# Patient Record
Sex: FEMALE | Race: BLACK OR AFRICAN AMERICAN | Employment: UNEMPLOYED | URBAN - METROPOLITAN AREA
[De-identification: names, ages, dates, MRNs, and addresses within clinical notes are randomized per-mention and may not be internally consistent; named-entity substitution may affect disease eponyms.]

---

## 2017-09-25 ENCOUNTER — HOSPITAL ENCOUNTER (EMERGENCY)
Age: 22
Discharge: STILL ADMITTED OR ADMINISTRATIVE | End: 2017-09-25
Attending: EMERGENCY MEDICINE

## 2022-12-29 ENCOUNTER — HOSPITAL ENCOUNTER (EMERGENCY)
Age: 27
Discharge: HOME | End: 2022-12-30
Attending: EMERGENCY MEDICINE
Payer: MEDICAID

## 2022-12-29 VITALS
TEMPERATURE: 99 F | RESPIRATION RATE: 16 BRPM | OXYGEN SATURATION: 100 % | BODY MASS INDEX: 46.38 KG/M2 | HEIGHT: 57 IN | HEART RATE: 98 BPM | SYSTOLIC BLOOD PRESSURE: 144 MMHG | WEIGHT: 215 LBS | DIASTOLIC BLOOD PRESSURE: 92 MMHG

## 2022-12-29 DIAGNOSIS — T26.92XA ALKALINE CHEMICAL BURN OF LEFT EYE: Primary | ICD-10-CM

## 2022-12-29 DIAGNOSIS — T54.3X1A ALKALINE CHEMICAL BURN OF LEFT EYE: Primary | ICD-10-CM

## 2022-12-29 PROCEDURE — 74011250636 HC RX REV CODE- 250/636: Performed by: EMERGENCY MEDICINE

## 2022-12-29 PROCEDURE — 74011000250 HC RX REV CODE- 250: Performed by: EMERGENCY MEDICINE

## 2022-12-29 PROCEDURE — 99283 EMERGENCY DEPT VISIT LOW MDM: CPT

## 2022-12-29 RX ORDER — SODIUM CHLORIDE 9 MG/ML
150 INJECTION, SOLUTION INTRAVENOUS
Status: COMPLETED | OUTPATIENT
Start: 2022-12-29 | End: 2022-12-30

## 2022-12-29 RX ORDER — TETRACAINE HYDROCHLORIDE 5 MG/ML
1 SOLUTION OPHTHALMIC
Status: COMPLETED | OUTPATIENT
Start: 2022-12-29 | End: 2022-12-29

## 2022-12-29 RX ADMIN — SODIUM CHLORIDE 150 ML/HR: 9 INJECTION, SOLUTION INTRAVENOUS at 23:50

## 2022-12-29 RX ADMIN — TETRACAINE HYDROCHLORIDE 1 DROP: 5 SOLUTION OPHTHALMIC at 23:50

## 2022-12-30 PROCEDURE — 74011000250 HC RX REV CODE- 250: Performed by: EMERGENCY MEDICINE

## 2022-12-30 RX ADMIN — FLUORESCEIN SODIUM 1 STRIP: 0.6 STRIP OPHTHALMIC at 00:53

## 2022-12-30 NOTE — ED NOTES
Emergency Department Nursing Plan of Care       The Nursing Plan of Care is developed from the Nursing assessment and Emergency Department Attending provider initial evaluation. The plan of care may be reviewed in the ED Provider note.     The Plan of Care was developed with the following considerations:   Patient / Family readiness to learn indicated by:verbalized understanding  Persons(s) to be included in education: patient  Barriers to Learning/Limitations:No    Signed     Debbie Rodriguez RN    12/30/2022   12:00 AM

## 2022-12-30 NOTE — ED NOTES

## 2022-12-30 NOTE — ED TRIAGE NOTES
Pt presents to the ED d/t chemical irritation to the left eye that occurred at 1430 today. Pt reports she was cleaning an oven at work when the gel \"sprayed back\" into her left eye. Pt reports her eye began to immediately burning. She used the eye wash station at work to clean out eye for 15 minutes according to the Safety Instructions on the bottle. (Product name: [employer] RTU, spray gel oven ). Pt is reporting vision changes to the left eye. She reports severe, sharp pain when eye begins to tear. Pt reporting 1/10 pain at this time but reports pain worsens when eye robert. Eye is intact, red and swollen.

## 2022-12-30 NOTE — ED NOTES
Spoke with poison control.  Irrigation with normal saline or LR recommended and fluorescin exam to rule out corneal abrasion

## 2022-12-30 NOTE — ED PROVIDER NOTES
24-year-old female presents after alkali injury to left eye at work around 2:30 PM.  Patient was cleaning the oven at work and accidentally sprayed oven  into her eye. The oven  is [employer] RTU (which has an active ingredient of sodium hydroxide-- an alkaline cleaning agent.) Patient irrigated eye for 15 minutes at work and had temporary relief of pain but pain returned around 3 PM.  She does wear contacts but was not wearing them at the time of exposure. Vision is currently at baseline. She feels a \"slight film\" on her left eye and she has mild eye pain when her eye tears up. No past medical history on file. No past surgical history on file. Family History:   Problem Relation Age of Onset    Diabetes Mother     Hypertension Father        Social History     Socioeconomic History    Marital status: SINGLE     Spouse name: Not on file    Number of children: Not on file    Years of education: Not on file    Highest education level: Not on file   Occupational History    Not on file   Tobacco Use    Smoking status: Never    Smokeless tobacco: Not on file   Substance and Sexual Activity    Alcohol use: No    Drug use: No    Sexual activity: Never   Other Topics Concern    Not on file   Social History Narrative    Not on file     Social Determinants of Health     Financial Resource Strain: Not on file   Food Insecurity: Not on file   Transportation Needs: Not on file   Physical Activity: Not on file   Stress: Not on file   Social Connections: Not on file   Intimate Partner Violence: Not on file   Housing Stability: Not on file         ALLERGIES: Patient has no known allergies. Review of Systems   Constitutional: Negative. Negative for chills, fever and unexpected weight change. HENT: Negative. Negative for congestion and trouble swallowing. Eyes:  Positive for pain and redness. Negative for discharge. Respiratory: Negative.   Negative for cough, chest tightness and shortness of breath. Cardiovascular: Negative. Negative for chest pain. Gastrointestinal: Negative. Negative for abdominal distention, abdominal pain, constipation, diarrhea and nausea. Endocrine: Negative. Genitourinary: Negative. Negative for difficulty urinating, dysuria, frequency and urgency. Musculoskeletal: Negative. Negative for arthralgias and myalgias. Skin: Negative. Negative for color change. Allergic/Immunologic: Negative. Neurological: Negative. Negative for dizziness, speech difficulty and headaches. Hematological: Negative. Psychiatric/Behavioral: Negative. Negative for agitation and confusion. All other systems reviewed and are negative. Vitals:    12/29/22 2326   BP: (!) 144/92   Pulse: 98   Resp: 16   Temp: 99 °F (37.2 °C)   SpO2: 100%   Weight: 97.5 kg (215 lb)   Height: 4' 9\" (1.448 m)            Physical Exam  Vitals and nursing note reviewed. Constitutional:       Appearance: She is well-developed. HENT:      Head: Normocephalic and atraumatic. Eyes:      General: Lids are normal.         Left eye: No discharge. Extraocular Movements:      Left eye: Normal extraocular motion and no nystagmus. Conjunctiva/sclera:      Left eye: Left conjunctiva is injected. No chemosis or exudate. Cardiovascular:      Rate and Rhythm: Normal rate and regular rhythm. Pulmonary:      Effort: Pulmonary effort is normal. No respiratory distress. Abdominal:      Palpations: Abdomen is soft. Tenderness: There is no abdominal tenderness. Musculoskeletal:         General: No deformity. Normal range of motion. Cervical back: Neck supple. Skin:     General: Skin is warm and dry. Neurological:      Mental Status: She is alert and oriented to person, place, and time. Psychiatric:         Behavior: Behavior normal.         Thought Content:  Thought content normal.        MDM  Number of Diagnoses or Management Options  Alkaline chemical burn of left eye  Diagnosis management comments: Alkali chemical exposure to left eye. Plan: Continuous irrigation with Lc lens until pH normalizes. Start with 30-minute Lc lens irrigation and then check pH after. ED Course as of 12/30/22 0134   Fri Dec 30, 2022   0056 Ph Paper shows ph after 30 minutes irrigation to be around 8 (turned blue-green rather than just green). Fluorescein exam negative. No uptake. [SS]   0115 Patient finished additional 600cc lc lense irrigation and now ph is 7- paper turned green. Patient states she feels completely better. No further eye pain / irritation. Would like d/c home. Counseled to return for more irrigation if eye irritation returns and to follow-up with her ophthalmologist today. [SS]      ED Course User Index  [SS] Robert Koroma MD       Eye Stain      Date/Time: 12/30/2022 12:57 AM    Performed by: attending        Corneal abrasion was not present on eyelid eversion. Cornea is clear. Anterior chamber is clear. Patient tolerance: patient tolerated the procedure well with no immediate complications  My total time at bedside, performing this procedure was 1-15 minutes. Comments: NO FLUORESCEIN UPTAKE. LABORATORY TESTS:  No results found for this or any previous visit (from the past 12 hour(s)). IMAGING RESULTS:  No orders to display       MEDICATIONS GIVEN:  Medications   tetracaine HCl (PF) (PONTOCAINE) 0.5 % ophthalmic solution 1 Drop (1 Drop Left Eye Given 12/29/22 2870)   0.9% sodium chloride infusion (0 mL/hr Ophthalmic IV Completed 12/30/22 0100)   fluorescein (FLU-CHEO) 0.6 mg ophthalmic strip 1 Strip (1 Strip Left Eye Given by Provider 12/30/22 0053)       IMPRESSION:  1. Alkaline chemical burn of left eye        PLAN:  1. Discharge Medication List as of 12/30/2022  1:18 AM        2.    Follow-up Information       Follow up With Specialties Details Why Estephanie Galloway  Call today [phone] [facility] [id number]    [facility] EMERGENCY DEPT Emergency Medicine  As needed, If symptoms worsen [address] [phone]          Return to ED if worse

## 2023-05-20 RX ORDER — LORATADINE 10 MG/1
10 TABLET ORAL
COMMUNITY

## 2023-05-20 RX ORDER — ESTRADIOL 0.5 MG/1
0.5 TABLET ORAL DAILY
COMMUNITY
Start: 2016-04-12